# Patient Record
Sex: FEMALE | Race: WHITE | URBAN - NONMETROPOLITAN AREA
[De-identification: names, ages, dates, MRNs, and addresses within clinical notes are randomized per-mention and may not be internally consistent; named-entity substitution may affect disease eponyms.]

---
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
## 2023-10-24 ENCOUNTER — TELEPHONE (OUTPATIENT)
Dept: OBSTETRICS AND GYNECOLOGY | Facility: CLINIC | Age: 23
End: 2023-10-24
Payer: MEDICAID

## 2023-11-02 ENCOUNTER — OFFICE VISIT (OUTPATIENT)
Dept: OBSTETRICS AND GYNECOLOGY | Facility: CLINIC | Age: 23
End: 2023-11-02
Payer: MEDICAID

## 2023-11-02 VITALS
DIASTOLIC BLOOD PRESSURE: 64 MMHG | BODY MASS INDEX: 17.71 KG/M2 | HEIGHT: 66 IN | SYSTOLIC BLOOD PRESSURE: 100 MMHG | WEIGHT: 110.2 LBS

## 2023-11-02 DIAGNOSIS — Z11.3 SCREENING FOR STDS (SEXUALLY TRANSMITTED DISEASES): Primary | ICD-10-CM

## 2023-11-02 DIAGNOSIS — R87.810 CERVICAL HIGH RISK HPV (HUMAN PAPILLOMAVIRUS) TEST POSITIVE: ICD-10-CM

## 2023-11-02 RX ORDER — UREA 10 %
LOTION (ML) TOPICAL
COMMUNITY

## 2023-11-02 NOTE — PROGRESS NOTES
"     Gynecologic  Exam Note        [facility]  STD screening      Subjective     HPI  Mayank Martinez is a 23 y.o.  female who presents to [facility], as she was referred due to a recent abnormal pap smear.  Pap was actually normal but HPV HR pos.  States they tested for HPV b/c she had gone in wanting to be tested for everything.  Patient reports problems with: none. Patient reports that her menses are absent, secondary to her nexplanon inserted 2022. She reports dysmenorrhea is none.     Partner Status: Marital Status: single.  She is sexually active. She has had new partners.. STD testing recommendations have been explained to the patient and she does desire STD testing.    Additional OB/GYN History   Current contraception: contraceptive methods: Nexplanon  Desires to:  discuss  contraception  Thromboembolic Disease: none    History of STD: no    Last Pap : 8/10/2023. Results: negative. HPV:  HPV-High risk detected- see [facility] results from [facility] .   Last Completed Pap Smear       This patient has no relevant Health Maintenance data.             History of abnormal Pap smear: yes   Gardasil status:completed    Family history of uterine, colon, breast, or ovarian cancer: no  Performs monthly Self-Breast Exam: no  Exercises Regularly:no  Feelings of Anxiety or Depression: no  Tobacco Usage?: Yes Mayank Martinez  reports that she has been smoking cigarettes. She has never used smokeless tobacco.. I have educated her on the risk of diseases from using tobacco products such as cancer, COPD, and heart disease.     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 We have discussed the following method/s for tobacco cessation:  Education Material Counseling [place] OTC Cessation Products Prescription Medicaiton.      I spent 3  minutes counseling the patient.            Current Outpatient Medications:     ferrous sulfate 140 (45 Fe) MG tablet controlled-release " "tablet, Take  by mouth Daily With Breakfast., Disp: , Rfl:      Patient denies the need for medication refills today.    OB History          2    Para   2    Term   2            AB        Living   2         SAB        IAB        Ectopic        Molar        Multiple        Live Births   2                Health Maintenance   Topic Date Due    Annual Gynecologic Pelvic and Breast Exam  Never done    BMI FOLLOWUP  Never done    COVID-19 Vaccine (1) Never done    HPV VACCINES (1 - 2-dose series) Never done    ANNUAL PHYSICAL  Never done    INFLUENZA VACCINE  Never done    TDAP/TD VACCINES (2 - Td or Tdap) 2023    CHLAMYDIA SCREENING  08/10/2024    HEPATITIS C SCREENING  Completed    MENINGOCOCCAL VACCINE  Completed    Pneumococcal Vaccine 0-64  Aged Out       Past Medical History:   Diagnosis Date    Abnormal Pap smear of cervix     Acid reflux     Depression     HPV (human papilloma virus) infection         Past Surgical History:   Procedure Laterality Date    WISDOM TOOTH EXTRACTION         The additional following portions of the patient's history were reviewed and updated as appropriate: allergies, current medications, past family history, past medical history, past social history, past surgical history, and problem list.    Review of Systems   Constitutional: Negative.    HENT: Negative.     Eyes: Negative.    Respiratory: Negative.     Cardiovascular: Negative.    Gastrointestinal: Negative.    Endocrine: Negative.    Genitourinary: Negative.    Musculoskeletal: Negative.    Skin: Negative.    Allergic/Immunologic: Negative.    Neurological: Negative.    Hematological: Negative.    Psychiatric/Behavioral: Negative.           I have reviewed and agree with the HPI, ROS, and historical information as entered above. Park Sanchez MD          Objective   /64   Ht 167.6 cm (66\")   Wt 50 kg (110 lb 3.2 oz)   LMP  (LMP Unknown)   BMI 17.79 kg/m²     Physical Exam  Physical " Exam:  General:  well developed; well nourished  no acute distress   Abdomen: soft, non-tender; no masses  no umbilical or inguinal hernias are present   Pelvis: Clinical staff was present for exam  External genitalia:  normal appearance of the external genitalia including Bartholin's and [place]'s glands.  :  urethral meatus normal;  Vaginal:  normal pink mucosa without prolapse or lesions.  Cervix:  normal appearance.  Uterus:  normal size, shape and consistency.  Adnexa:  normal bimanual exam of the adnexa.         Assessment and Plan    Problem List Items Addressed This Visit       Screening for STDs (sexually transmitted diseases) - Primary    Relevant Orders    Chlamydia trachomatis, Neisseria gonorrhoeae, Trichomonas vaginalis, PCR - Swab, Cervix    HIV-1 / O / 2 Ag / Antibody    RPR    Hepatitis Panel, Acute    Cervical high risk HPV (human papillomavirus) test positive       GYN annual well woman exam.   Reviewed pap guidelines.   Keep immune system healthy, quit smoking.  Repeat pap 1 year.  Encouraged use of condoms for STD prevention.  Return in about 1 year (around 11/2/2024) for Annual physical.      Park Sanchez MD  11/02/2023

## 2023-11-03 ENCOUNTER — PATIENT ROUNDING (BHMG ONLY) (OUTPATIENT)
Dept: OBSTETRICS AND GYNECOLOGY | Facility: CLINIC | Age: 23
End: 2023-11-03
Payer: MEDICAID

## 2023-11-03 LAB
HAV IGM SERPL QL IA: NEGATIVE
HBV CORE IGM SERPL QL IA: NEGATIVE
HBV SURFACE AG SERPL QL IA: NEGATIVE
HCV AB SERPL QL IA: NORMAL
HCV IGG SERPL QL IA: NON REACTIVE
HIV 1+2 AB+HIV1 P24 AG SERPL QL IA: NON REACTIVE
RPR SER QL: NON REACTIVE

## 2023-11-03 NOTE — PROGRESS NOTES
November 3, 2023    Hello, may I speak with Mayank Martinez?    My name is Sloane    I am  with [facility]  [facility]  [address]  [place] [address] [phone].    Before we get started may I verify your date of birth? 2000    I am calling to officially welcome you to our practice and ask about your recent visit. Is this a good time to talk? N/A LEFT VM    Tell me about your visit with us. What things went well?  N/A       We're always looking for ways to make our patients' experiences even better. Do you have recommendations on ways we may improve?  N/A    Overall were you satisfied with your first visit to our practice? N/A       I appreciate you taking the time to speak with me today. Is there anything else I can do for you? N/A      Thank you, and have a great day.

## 2023-11-04 ENCOUNTER — HOSPITAL ENCOUNTER (EMERGENCY)
Age: 23
LOS: 1 days | Discharge: HOME | End: 2023-11-05
Payer: COMMERCIAL

## 2023-11-04 VITALS
HEART RATE: 78 BPM | SYSTOLIC BLOOD PRESSURE: 107 MMHG | DIASTOLIC BLOOD PRESSURE: 68 MMHG | OXYGEN SATURATION: 98 % | RESPIRATION RATE: 18 BRPM | TEMPERATURE: 98.7 F

## 2023-11-04 VITALS — RESPIRATION RATE: 18 BRPM | OXYGEN SATURATION: 98 % | TEMPERATURE: 98.7 F

## 2023-11-04 VITALS — BODY MASS INDEX: 19.3 KG/M2

## 2023-11-04 DIAGNOSIS — R53.83: ICD-10-CM

## 2023-11-04 DIAGNOSIS — B96.29: ICD-10-CM

## 2023-11-04 DIAGNOSIS — F17.210: ICD-10-CM

## 2023-11-04 DIAGNOSIS — R30.0: Primary | ICD-10-CM

## 2023-11-04 PROCEDURE — 81025 URINE PREGNANCY TEST: CPT

## 2023-11-04 PROCEDURE — 87636 SARSCOV2 & INF A&B AMP PRB: CPT

## 2023-11-04 PROCEDURE — 87491 CHLMYD TRACH DNA AMP PROBE: CPT

## 2023-11-04 PROCEDURE — 87591 N.GONORRHOEAE DNA AMP PROB: CPT

## 2023-11-04 PROCEDURE — 81001 URINALYSIS AUTO W/SCOPE: CPT

## 2023-11-04 PROCEDURE — 99283 EMERGENCY DEPT VISIT LOW MDM: CPT

## 2023-11-04 PROCEDURE — 87086 URINE CULTURE/COLONY COUNT: CPT

## 2023-11-05 VITALS
RESPIRATION RATE: 16 BRPM | HEART RATE: 72 BPM | SYSTOLIC BLOOD PRESSURE: 104 MMHG | DIASTOLIC BLOOD PRESSURE: 65 MMHG | TEMPERATURE: 98.06 F

## 2023-11-05 LAB
COLOR UR: YELLOW
LEUKOCYTE ESTERASE UR QL STRIP: (no result)
MICRO URNS: (no result)
PH UR: 6.5 [PH] (ref 5–8.5)
SP GR UR: <= 1.005 (ref 1–1.03)
SQUAMOUS URNS QL MICRO: (no result) #/HPF (ref 0–5)
UROBILINOGEN UR QL: 0.2 EU/DL
WBC # UR: (no result) #/HPF (ref 0–3)

## 2023-11-06 LAB
C TRACH RRNA SPEC QL NAA+PROBE: NEGATIVE
N GONORRHOEA RRNA SPEC QL NAA+PROBE: NEGATIVE
T VAGINALIS RRNA SPEC QL NAA+PROBE: NEGATIVE

## 2023-11-06 RX ORDER — UREA 10 %
LOTION (ML) TOPICAL
OUTPATIENT
Start: 2023-11-06

## 2023-11-06 NOTE — TELEPHONE ENCOUNTER
Rx Refill Note  Requested Prescriptions     Refused Prescriptions Disp Refills    ferrous sulfate 140 (45 Fe) MG tablet controlled-release tablet       Sig: Take  by mouth Daily With Breakfast.      Brittany Walters MA  11/06/23, 07:55 EST    Patient has never been seen in this office.

## 2023-11-09 NOTE — PC.NURSE
URINE CULTURE ON WORKLIST, NOTIFIED DR. CLINE, REQUEST F/U CALL ON PT TO SEE IF PT HAS SYMPTOMS. STATES IF SYMPTOMS WILL CALL IN ANTIBIOTICS- IF NOT NO FURTHER TREATMENT NEEDED.

## 2024-07-24 ENCOUNTER — OFFICE VISIT (OUTPATIENT)
Dept: OBSTETRICS AND GYNECOLOGY | Facility: CLINIC | Age: 24
End: 2024-07-24
Payer: MEDICAID

## 2024-07-24 VITALS
BODY MASS INDEX: 18.32 KG/M2 | DIASTOLIC BLOOD PRESSURE: 68 MMHG | SYSTOLIC BLOOD PRESSURE: 100 MMHG | HEIGHT: 66 IN | WEIGHT: 114 LBS

## 2024-07-24 DIAGNOSIS — R87.810 CERVICAL HIGH RISK HPV (HUMAN PAPILLOMAVIRUS) TEST POSITIVE: Primary | ICD-10-CM

## 2024-07-24 DIAGNOSIS — Z30.46 ENCOUNTER FOR NEXPLANON REMOVAL: ICD-10-CM

## 2024-07-24 DIAGNOSIS — Z30.011 ENCOUNTER FOR INITIAL PRESCRIPTION OF CONTRACEPTIVE PILLS: ICD-10-CM

## 2024-07-24 RX ORDER — NORGESTIMATE AND ETHINYL ESTRADIOL 0.25-0.035
1 KIT ORAL DAILY
Qty: 84 TABLET | Refills: 3 | Status: SHIPPED | OUTPATIENT
Start: 2024-07-24

## 2024-07-24 RX ORDER — NORGESTIMATE AND ETHINYL ESTRADIOL 0.25-0.035
1 KIT ORAL DAILY
Qty: 84 TABLET | Refills: 3 | Status: SHIPPED | OUTPATIENT
Start: 2024-07-24 | End: 2024-07-24

## 2024-07-24 RX ORDER — MEDROXYPROGESTERONE ACETATE 150 MG/ML
150 INJECTION, SUSPENSION INTRAMUSCULAR
Status: CANCELLED | OUTPATIENT
Start: 2024-07-24

## 2024-07-24 NOTE — PROGRESS NOTES
"            Procedure: Nexplanon removal    Procedures    Pre Dx: 1) Nexplanon removal  Post Dx: 1) Nexplanon removal   The risks, benefits, and alternatives to removal and reinsertion of the device have been discussed with the patient at length. All of her questions are answered. She is aware of the need for alternative means of contraception if desired. Verbal informed consent is obtained. THE PATIENT WOULD LIKE TO RE-START DEPO PROVERA.    Patient does not have an allergy to betadine or shellfish.    /68   Ht 167.6 cm (66\")   Wt 51.7 kg (114 lb)   LMP 07/10/2024 (Approximate)   BMI 18.40 kg/m²       Time out: immediate members of the procedure team and patient agree to the following: correct patient, correct site, correct procedure to be performed. Park Sanchez MD      Able to easily palpate the device in the  Left arm. Additional imaging was not required. The device feels freely mobile and easily accessible. She was placed in the examining table in a supine position with her arm flexed at the elbow and hand under her head. The skin over this site is prepped with Betadine. 2-3 mL of 1% lidocaine with epinephrine was injected.    Downward pressure was applied on the proximal end of the implant nearest the axilla, and a 2-3 mm incision was made in a longitudinal direction of the arm at the tip of the implant closest to the elbow. The implant was then pushed gently toward the incision until the tip was visible. The fibrous capsule was opened with blunt dissection using a hemostat. The implant was grasp with a hemostat and was easily removed intact. It measured a  full 4 cm in length.    Steristrip was placed over incision site as well as a pressure dressing.     Tolerated well  No apparent complications  She was advised to call or return for complications such as fever, signs of infection, increased pain, or any other concerns.    Warning signs, limitations and expectations reviewed.     Park VERA" MD Laura  07/24/2024

## 2024-10-11 ENCOUNTER — TELEPHONE (OUTPATIENT)
Dept: OBSTETRICS AND GYNECOLOGY | Facility: CLINIC | Age: 24
End: 2024-10-11
Payer: MEDICAID

## 2024-10-11 DIAGNOSIS — Z30.011 ENCOUNTER FOR INITIAL PRESCRIPTION OF CONTRACEPTIVE PILLS: Primary | ICD-10-CM

## 2024-10-11 RX ORDER — NORGESTIMATE AND ETHINYL ESTRADIOL 0.25-0.035
1 KIT ORAL DAILY
Qty: 84 TABLET | Refills: 3 | Status: SHIPPED | OUTPATIENT
Start: 2024-10-11

## 2024-10-11 NOTE — TELEPHONE ENCOUNTER
Pt called in to refill birth control, norgestimate-ethinyl estradiol (Sprintec 28) 0.25-35 MG-MCG per tablet (07/24/2024)     Pt called back to update pharmacy  location     She going out of town for 1 week, leaving 10.15.24, wanted to know if she can refill sooner      [facility]  [address] [place] [id number] [phone]